# Patient Record
Sex: MALE
[De-identification: names, ages, dates, MRNs, and addresses within clinical notes are randomized per-mention and may not be internally consistent; named-entity substitution may affect disease eponyms.]

---

## 2018-06-27 ENCOUNTER — HOSPITAL ENCOUNTER (EMERGENCY)
Dept: HOSPITAL 88 - FSED | Age: 68
Discharge: HOME | End: 2018-06-27
Payer: MEDICARE

## 2018-06-27 VITALS — BODY MASS INDEX: 25.11 KG/M2 | HEIGHT: 67 IN | WEIGHT: 160 LBS

## 2018-06-27 DIAGNOSIS — I10: ICD-10-CM

## 2018-06-27 DIAGNOSIS — K91.841: Primary | ICD-10-CM

## 2018-06-27 DIAGNOSIS — I25.2: ICD-10-CM

## 2018-06-27 DIAGNOSIS — E78.5: ICD-10-CM

## 2018-06-27 PROCEDURE — 99283 EMERGENCY DEPT VISIT LOW MDM: CPT

## 2019-09-02 ENCOUNTER — HOSPITAL ENCOUNTER (OUTPATIENT)
Dept: HOSPITAL 88 - FSED | Age: 69
Setting detail: OBSERVATION
LOS: 1 days | Discharge: HOME | End: 2019-09-03
Attending: INTERNAL MEDICINE | Admitting: INTERNAL MEDICINE
Payer: MEDICARE

## 2019-09-02 VITALS — HEIGHT: 68 IN | BODY MASS INDEX: 23.64 KG/M2 | WEIGHT: 156 LBS

## 2019-09-02 VITALS — SYSTOLIC BLOOD PRESSURE: 148 MMHG | DIASTOLIC BLOOD PRESSURE: 83 MMHG

## 2019-09-02 DIAGNOSIS — Z95.5: ICD-10-CM

## 2019-09-02 DIAGNOSIS — I10: ICD-10-CM

## 2019-09-02 DIAGNOSIS — E78.00: ICD-10-CM

## 2019-09-02 DIAGNOSIS — I25.10: ICD-10-CM

## 2019-09-02 DIAGNOSIS — R07.9: Primary | ICD-10-CM

## 2019-09-02 PROCEDURE — 93005 ELECTROCARDIOGRAM TRACING: CPT

## 2019-09-02 PROCEDURE — 82550 ASSAY OF CK (CPK): CPT

## 2019-09-02 PROCEDURE — 80061 LIPID PANEL: CPT

## 2019-09-02 PROCEDURE — 71046 X-RAY EXAM CHEST 2 VIEWS: CPT

## 2019-09-02 PROCEDURE — 36415 COLL VENOUS BLD VENIPUNCTURE: CPT

## 2019-09-02 PROCEDURE — 99284 EMERGENCY DEPT VISIT MOD MDM: CPT

## 2019-09-02 PROCEDURE — 82553 CREATINE MB FRACTION: CPT

## 2019-09-02 PROCEDURE — 84484 ASSAY OF TROPONIN QUANT: CPT

## 2019-09-02 PROCEDURE — 93306 TTE W/DOPPLER COMPLETE: CPT

## 2019-09-02 PROCEDURE — 85025 COMPLETE CBC W/AUTO DIFF WBC: CPT

## 2019-09-02 PROCEDURE — 80053 COMPREHEN METABOLIC PANEL: CPT

## 2019-09-02 RX ADMIN — FAMOTIDINE SCH MG: 20 TABLET, FILM COATED ORAL at 21:20

## 2019-09-02 NOTE — XMS REPORT
Patient Summary Document

                             Created on: 2019



ELIZABETH WEEKS

External Reference #: [id number]

: 1950

Sex: Male



Demographics







                          Address                   [address] [place]  [address]

 

                          Home Phone                [phone]

 

                          Preferred Language        Unknown

 

                          Marital Status            Unknown

 

                          B[employer]ist Affiliation     Unknown

 

                          Race                      Unknown

 

                                        Additional Race(s)  

 

                          Ethnic Group              Unknown





Author







                          [facility]                    [facility]

 

                          Address                   Unknown

 

                          Phone                     Unavailable







Care Team Providers







                    Care Team Member Name    Role                Phone

 

                    LASHAUN CAPUTO    Unavailable         Unavailable







Problems

This patient has no known problems.



Allergies, Adverse Reactions, Alerts

This patient has no known allergies or adverse reactions.



Medications

This patient has no known medications.



Results







           Test Description    Test Time    Test Comments    Text Results    Atomic Results    Result

 Comments

 

                CXR 2 VIEW - HOPD    2019 20:07:00                                                         

                                                 [place] [address]      Patient Name: ELIZABETH WEEKS                                   MR 
#: [id number]                     : 1950                               
   Age/Sex: 69/M  Acct #: [id number]                              Req #: [id number]
[id number]  Adm Physician:                                                      
Ordered by: MARIA ESTHER CAPUTO MD                            Report #: [id number]
       Location: [facility]                                    Room/Bed:              
      
___________________________________________________________________________________________________
   Procedure: [id number] HOPD/CXR 2 VIEW - HOPD  Exam Date: 19             
              Exam Time:                                               
REPORT STATUS: Signed    EXAMINATION:  CXR 2 VIEW - HOPD          INDICATION: C
hest pain for 12 hours, history of MI     2019      COMPARISON:  
None           FINDINGS:  PA and lateral views      TUBES and LINES:  None.     
LUNGS:  Lungs are well inflated.  There is no evidence of pneumonia or   
pulmonary edema.      PLEURA:  No pleural effusion or pneumothorax. There is 
central eventration of   the right diaphragm.      HEART AND MEDIASTINUM:  The 
heart is top normal in size. The aorta is mildly   tortuous. Coronary artery 
stents are present.      BONES AND SOFT TISSUES:  Mild degenerative changes of 
the spine. No focal   osseous lesions.   Soft tissues are unremarkable.      
UPPER ABDOMEN: No free air under the diaphragm.       IMPRESSION:    No acute 
thoracic abnormality.         Signed by: Dr. Kateryna Baldwin MD on 2019 
8:08 PM        Dictated By: KATERYNA BALDWIN MD  Electronically Signed By: 
KATERYNA BALDWIN MD on 19  Transcribed By: ABHI on 19 
     COPY TO:   MARIA ESTHER CAPUTO MD

## 2019-09-02 NOTE — DIAGNOSTIC IMAGING REPORT
EXAMINATION:  CXR 2 VIEW - HOPD    



INDICATION: Chest pain for 12 hours, history of MI 

^[id number]

^1945



COMPARISON:  None

     

FINDINGS:  PA and lateral views



TUBES and LINES:  None.



LUNGS:  Lungs are well inflated.  There is no evidence of pneumonia or

pulmonary edema.



PLEURA:  No pleural effusion or pneumothorax. There is central eventration of

the right diaphragm.



HEART AND MEDIASTINUM:  The heart is top normal in size. The aorta is mildly

tortuous. Coronary artery stents are present.



BONES AND SOFT TISSUES:  Mild degenerative changes of the spine. No focal

osseous lesions.   Soft tissues are unremarkable.



UPPER ABDOMEN: No free air under the diaphragm. 



IMPRESSION: 

No acute thoracic abnormality.





Signed by: Dr. Marion Baldwin MD on 9/2/2019 8:08 PM

## 2019-09-02 NOTE — XMS REPORT
Clinical Summary

                             Created on: 2019



Vish Garcia

External Reference #: [id number]

: 1950

Sex: Male



Demographics







                          Address                   [address] [place]  [address]

 

                          Home Phone                [phone]

 

                          Preferred Language        English

 

                          Marital Status            Unknown

 

                          Muslim Affiliation     Unknown

 

                          Race                      White

 

                          Ethnic Group              /Latin





Author







                          Author                    [facility]

 

                          Address                   Unknown

 

                          Phone                     Unavailable







Support







                Name            Relationship    Address         Phone

 

                Christian Garcia    [employer]            Unknown         [phone]

 

                Neisha Garcia    [employer]            Unknown         [phone]







Care Team Providers







                    Care Team Member Name    Role                Phone

 

                          PCP                       Unavailable







Allergies

Not on File



Medications

Not on file



Active Problems





Not on file



Social History







                                        Date



                 Tobacco Use     Types           Packs/Day       Years Used 

 

                                         



                                         Never Assessed    









 



                           Sex Assigned at Birth     Date Recorded

 

 



                                         Not on file 









                                        Industry



                           Job Start Date            Occupation 

 

                                        Not on file



                           Not on file               Not on file 









                                        Travel End



                           Travel History            Travel Start 

 





                                         No recent travel history available.







Last Filed Vital Signs

Not on file



Plan of Treatment





Not on file



Results

Not on fileafter 2018



Insurance







     



            Payer      Benefit     Subscriber ID     Type       Phone      Address



                                         Plan /    



                                         Group    

 

     



                     [facility]   



                                         MEDIC[facility]    



                                         ADV    









     



            Guarantor Name     Account     Relation to     Date of     Phone      Billing Address



                     Type                Patient             Birth  

 

     



            Vish Garcia     Personal/F     Self       1950     [phone]     [address]               (Home)              [place] [address]

## 2019-09-02 NOTE — XMS REPORT
Clinical Summary

                             Created on: 2019



Vish Garcia

External Reference #: [id number]

: 1950

Sex: Male



Demographics







                          Address                   [address] [place]  [address]

 

                          Home Phone                [phone]

 

                          Preferred Language        English

 

                          Marital Status            Single

 

                          Sabianist Affiliation     [id number]

 

                          Race                      White

 

                          Ethnic Group              /Latin





Author







                          Author                    [facility]

 

                          Organization              [employer]

 

                          Address                   Unknown

 

                          Phone                     Unavailable







Support







                Name            Relationship    Address         Phone

 

                Christian Garcia    [employer]            Unknown         [phone]







Care Team Providers







                    Care Team Member Name    Role                Phone

 

                    Asked, No Pcp       PCP                 Unavailable







Allergies

No Known Allergies



Medications

Not on file



Active Problems





Not on file



Social History







                                        Date



                 Tobacco Use     Types           Packs/Day       Years Used 

 

                                         



                                         Never Smoker    

 

    



                                         Smokeless Tobacco: Never   



                                         Used   









                    Drinks/Week         oz/Week             Comments



                                         Alcohol Use   

 

                                                             



                                         No   









 



                           Sex Assigned at Birth     Date Recorded

 

 



                                         Not on file 









                                        Industry



                           Job Start Date            Occupation 

 

                                        Not on file



                           Not on file               Not on file 









                                        Travel End



                           Travel History            Travel Start 

 





                                         No recent travel history available.







Last Filed Vital Signs

Not on file



Plan of Treatment





Not on file



Results

Not on fileafter 2018



Insurance







                                        Type



            Payer      Benefit     Subscriber ID     Effective     Phone      Address 



                           Plan /                    Dates   



                                         Group     

 

                                        O



                 [facility]     10/1/2016-   



                           ADVANTAGE                 Present   



                                         [facility]     









     



            Guarantor Name     Account     Relation to     Date of     Phone      Billing Address



                     Type                Patient             Birth  

 

     



            Vish Garcia     Personal/F     Self       1950     [phone]     [address]





                     [address]               (Home)              [place] [address]







Advance Directives





For more information, please contact: [phone]









                          Patient Representative    Explanation



                           Type                      Date Recorded  

 

                                                     



                           Advance Directives,       2018  6:58 PM  



                                         Living Will and   



                                         Medical Power of

## 2019-09-02 NOTE — XMS REPORT
Clinical Summary

                             Created on: 2019



Vish Garcia

External Reference #: [id number]

: 1950

Sex: Male



Demographics







                          Address                   [address] [place]  [address]

 

                          Home Phone                [phone]

 

                          Preferred Language        English

 

                          Marital Status            Unknown

 

                          Mormon Affiliation     Unknown

 

                          Race                      White

 

                          Ethnic Group              /Latin





Author







                          Author                    [facility]

 

                          Address                   Unknown

 

                          Phone                     Unavailable







Support







                Name            Relationship    Address         Phone

 

                Christian Garcia    [employer]            Unknown         [phone]

 

                Neisha Garcia    [employer]            Unknown         [phone]







Care Team Providers







                    Care Team Member Name    Role                Phone

 

                          PCP                       Unavailable







Allergies

Not on File



Medications

Not on file



Active Problems





Not on file



Social History







                                        Date



                 Tobacco Use     Types           Packs/Day       Years Used 

 

                                         



                                         Never Assessed    









 



                           Sex Assigned at Birth     Date Recorded

 

 



                                         Not on file 









                                        Industry



                           Job Start Date            Occupation 

 

                                        Not on file



                           Not on file               Not on file 









                                        Travel End



                           Travel History            Travel Start 

 





                                         No recent travel history available.







Last Filed Vital Signs

Not on file



Plan of Treatment





Not on file



Results

Not on fileafter 2018



Insurance







     



            Payer      Benefit     Subscriber ID     Type       Phone      Address



                                         Plan /    



                                         Group    

 

     



                     [facility]          xxxxxxxxxxx   



                                         MEDICARE    



                                         ADV    









     



            Guarantor Name     Account     Relation to     Date of     Phone      Billing Address



                     Type                Patient             Birth  

 

     



            Vish Garcia     Personal/F     Self       1950     [phone]     [address]               (Home)              [place] [address]

## 2019-09-02 NOTE — XMS REPORT
Clinical Summary

                             Created on: 2019



Vish Garcia

External Reference #: [id number]

: 1950

Sex: Male



Demographics







                          Address                   [address] [place]  [address]

 

                          Home Phone                [phone]

 

                          Preferred Language        English

 

                          Marital Status            Single

 

                          Quaker Affiliation     [id number]

 

                          Race                      White

 

                          Ethnic Group              /Latin





Author







                          Author                    [facility]

 

                          Organization              [employer]

 

                          Address                   Unknown

 

                          Phone                     Unavailable







Support







                Name            Relationship    Address         Phone

 

                Christian Garcia    [employer]            Unknown         [phone]







Care Team Providers







                    Care Team Member Name    Role                Phone

 

                    Asked, No Pcp       PCP                 Unavailable







Allergies

No Known Allergies



Medications

Not on file



Active Problems





Not on file



Social History







                                        Date



                 Tobacco Use     Types           Packs/Day       Years Used 

 

                                         



                                         Never Smoker    

 

    



                                         Smokeless Tobacco: Never   



                                         Used   









                    Drinks/Week         oz/Week             Comments



                                         Alcohol Use   

 

                                                             



                                         No   









 



                           Sex Assigned at Birth     Date Recorded

 

 



                                         Not on file 









                                        Industry



                           Job Start Date            Occupation 

 

                                        Not on file



                           Not on file               Not on file 









                                        Travel End



                           Travel History            Travel Start 

 





                                         No recent travel history available.







Last Filed Vital Signs

Not on file



Plan of Treatment





Not on file



Results

Not on fileafter 2018



Insurance







                                        Type



            Payer      Benefit     Subscriber ID     Effective     Phone      Address 



                           Plan /                    Dates   



                                         Group     

 

                                        O



                 [facility]     10/1/2016-   



                           ADVANTAGE                 Present   



                                         [facility]     









     



            Guarantor Name     Account     Relation to     Date of     Phone      Billing Address



                     Type                Patient             Birth  

 

     



            Vish Garcia     Personal/F     Self       1950     [phone]     [address]





                     [address]               (Home)              [place] [address]







Advance Directives





For more information, please contact: [phone]









                          Patient Representative    Explanation



                           Type                      Date Recorded  

 

                                                     



                           Advance Directives,       2018  6:58 PM  



                                         Living Will and   



                                         Medical Power of

## 2019-09-03 VITALS — DIASTOLIC BLOOD PRESSURE: 70 MMHG | SYSTOLIC BLOOD PRESSURE: 113 MMHG

## 2019-09-03 VITALS — SYSTOLIC BLOOD PRESSURE: 125 MMHG | DIASTOLIC BLOOD PRESSURE: 70 MMHG

## 2019-09-03 VITALS — DIASTOLIC BLOOD PRESSURE: 83 MMHG | SYSTOLIC BLOOD PRESSURE: 148 MMHG

## 2019-09-03 VITALS — SYSTOLIC BLOOD PRESSURE: 113 MMHG | DIASTOLIC BLOOD PRESSURE: 70 MMHG

## 2019-09-03 LAB
ALBUMIN SERPL-MCNC: 3.6 G/DL (ref 3.5–5)
ALBUMIN/GLOB SERPL: 1.2 {RATIO} (ref 0.8–2)
ALP SERPL-CCNC: 125 IU/L (ref 40–150)
ALT SERPL-CCNC: 12 IU/L (ref 0–55)
ANION GAP SERPL CALC-SCNC: 11.8 MMOL/L (ref 8–16)
BASOPHILS # BLD AUTO: 0.1 10*3/UL (ref 0–0.1)
BASOPHILS NFR BLD AUTO: 0.7 % (ref 0–1)
BUN SERPL-MCNC: 12 MG/DL (ref 7–26)
BUN/CREAT SERPL: 12 (ref 6–25)
CALCIUM SERPL-MCNC: 9.2 MG/DL (ref 8.4–10.2)
CHLORIDE SERPL-SCNC: 100 MMOL/L (ref 98–107)
CHOLEST SERPL-MCNC: 86 MD/DL (ref 0–199)
CHOLEST/HDLC SERPL: 2.9 {RATIO} (ref 3.9–4.7)
CK MB SERPL-MCNC: 1.8 NG/ML (ref 0–5)
CK MB SERPL-MCNC: 1.9 NG/ML (ref 0–5)
CK SERPL-CCNC: 171 IU/L (ref 30–200)
CK SERPL-CCNC: 181 IU/L (ref 30–200)
CO2 SERPL-SCNC: 26 MMOL/L (ref 22–29)
DEPRECATED NEUTROPHILS # BLD AUTO: 4.8 10*3/UL (ref 2.1–6.9)
EGFRCR SERPLBLD CKD-EPI 2021: > 60 ML/MIN (ref 60–?)
EOSINOPHIL # BLD AUTO: 0.2 10*3/UL (ref 0–0.4)
EOSINOPHIL NFR BLD AUTO: 2.2 % (ref 0–6)
ERYTHROCYTE [DISTWIDTH] IN CORD BLOOD: 13.3 % (ref 11.7–14.4)
GLOBULIN PLAS-MCNC: 3.1 G/DL (ref 2.3–3.5)
GLUCOSE SERPLBLD-MCNC: 106 MG/DL (ref 74–118)
HCT VFR BLD AUTO: 35.1 % (ref 38.2–49.6)
HDLC SERPL-MSCNC: 30 MG/DL (ref 40–60)
HGB BLD-MCNC: 11.8 G/DL (ref 14–18)
LDLC SERPL CALC-MCNC: 37 MG/DL (ref 60–130)
LYMPHOCYTES # BLD: 2.6 10*3/UL (ref 1–3.2)
LYMPHOCYTES NFR BLD AUTO: 31.2 % (ref 18–39.1)
MCH RBC QN AUTO: 29.1 PG (ref 28–32)
MCHC RBC AUTO-ENTMCNC: 33.6 G/DL (ref 31–35)
MCV RBC AUTO: 86.7 FL (ref 81–99)
MONOCYTES # BLD AUTO: 0.7 10*3/UL (ref 0.2–0.8)
MONOCYTES NFR BLD AUTO: 7.8 % (ref 4.4–11.3)
NEUTS SEG NFR BLD AUTO: 57.7 % (ref 38.7–80)
PLATELET # BLD AUTO: 236 X10E3/UL (ref 140–360)
POTASSIUM SERPL-SCNC: 3.8 MMOL/L (ref 3.5–5.1)
RBC # BLD AUTO: 4.05 X10E6/UL (ref 4.3–5.7)
SODIUM SERPL-SCNC: 134 MMOL/L (ref 136–145)
TRIGL SERPL-MCNC: 96 MG/DL (ref 0–149)

## 2019-09-03 RX ADMIN — FAMOTIDINE SCH MG: 20 TABLET, FILM COATED ORAL at 08:48

## 2019-09-03 NOTE — HISTORY AND PHYSICAL
PRIMARY CARE PHYSICIAN:  Dr. Dajuan Schaffer at [facility]

 

CHIEF COMPLAINT:  Chest pain that lasted over 12 hours.

 

HISTORY OF PRESENT ILLNESS:  This is a 69-year-old male with past medical history of

hypertension, high cholesterol, and CAD with three stents that was done in 2017,

presented to the [facility] ER with complaints of chest pain that started 6:00 a.m.

yesterday and has been mildly present throughout the day.  He reports the pain is

stabbing and rates it at a 3 to 5, mild discomfort, not excruciating.  He reports mild

left shoulder pain, which resolved.  He denies any fever, chills, nausea, vomiting,

abdominal pain, dysuria, hematuria, cough, or diaphoresis.  He did not taking any

medication to relieve his symptoms so his son brought him in, in the afternoon as the

pain was still present.  He was kept on the monitor, sinus rhythm with no ST changes on

EKG.  Cardiac enzymes have been negative, chest x-ray unremarkable.  Was sent under

observation. 

 

PAST MEDICAL HISTORY:  

1. Hypertension.

2. High cholesterol.

3. CAD with stent placement.

 

PAST SURGICAL HISTORY:  Left heart catheterization with stent x2 in 2017.

 

FAMILY MEDICAL HISTORY:  He reports diabetes on both father and mother side.

 

SOCIAL HISTORY:  He denies any tobacco, alcohol, or illicit drug use.  He is ambulatory.

 

REVIEW OF SYSTEMS:

GENERAL:  Appears in no distress. 

HEENT:  No mouth sores. 

LUNGS:  No shortness of breath or cough. 

CARDIOVASCULAR:  Chest pain improved, no palpitations or edema. 

GI:  No nausea, vomiting, or diarrhea. 

:  No dysuria or hematuria. 

NEUROLOGIC:  Alert and oriented. 

MUSCULOSKELETAL:  Able to walk.  No dizziness. 

SKIN:  Intact and dry.  No rash.

 

PHYSICAL EXAMINATION:

VITAL SIGNS:  Temperature 96.7, pulse is 61, blood pressure is 113/70, respirations 16,

SpO2 is 97. 

GENERAL:  Appears in no acute distress. 

HEENT:  Normocephalic, atraumatic. 

LUNGS:  Clear to auscultation. 

CARDIOVASCULAR:  S1 and S2.  No murmurs. 

GI:  Soft, nontender, nondistended. 

NEUROLOGIC:  Alert, awake, oriented x3. 

MUSCULOSKELETAL:  Active ROM.  No pain or edema noted. 

SKIN:  Has no rash.  Dry and intact.

LABORATORY DATA:  Sodium 134, potassium 3.8, chloride 100, CO2 is 26, creatinine is

0.99.  WBCs 8.29, hemoglobin 11.8, hematocrit 35, and troponin is negative.  LDL 37, HDL

30, triglycerides 96.  Chest x-ray is unremarkable. 

 

IMPRESSION:  

1. Chest pain, rule out acute coronary syndrome.

2. Hypertension.

3. History of coronary artery disease with stents x3.

4. High cholesterol.

 

PLAN:  We will continue to monitor him on cardiac telemetry, continue his home

medication of aspirin, Plavix, and atorvastatin, we will check an echocardiogram as it

has been more than a year since his last echo per his son.  If echo is normal, we will

discharge home later today to follow up with his PCP, Dr. Schaffer and his cardiologist,

Dr. Davis in 1 to 2 weeks.  He is full code and power of  is his son, Christian Garcia. 

 

 

Dictated by Cheryl Perez, LORRAINE

 

______________________________

Rancho Garrison MD

 

MY/MODL

D:  09/03/2019 13:26:51

T:  09/03/2019 19:11:07

Job #:  [id number]

## 2019-09-03 NOTE — NUR
patient discharged home, IV canula removed with tip intact, no ss of infiltration. denies 
any pain or distress, son at bed side to take patient home. transported via wheelchair to 
[facility]

## 2021-06-14 ENCOUNTER — HOSPITAL ENCOUNTER (EMERGENCY)
Dept: HOSPITAL 88 - FSED | Age: 71
LOS: 1 days | Discharge: HOME | End: 2021-06-15
Payer: MEDICARE

## 2021-06-14 VITALS — BODY MASS INDEX: 24.48 KG/M2 | HEIGHT: 67 IN | WEIGHT: 156 LBS

## 2021-06-14 DIAGNOSIS — N40.0: ICD-10-CM

## 2021-06-14 DIAGNOSIS — E78.5: ICD-10-CM

## 2021-06-14 DIAGNOSIS — N20.0: ICD-10-CM

## 2021-06-14 DIAGNOSIS — I25.10: ICD-10-CM

## 2021-06-14 DIAGNOSIS — R31.9: ICD-10-CM

## 2021-06-14 DIAGNOSIS — R30.0: Primary | ICD-10-CM

## 2021-06-14 DIAGNOSIS — F03.90: ICD-10-CM

## 2021-06-14 PROCEDURE — 85025 COMPLETE CBC W/AUTO DIFF WBC: CPT

## 2021-06-14 PROCEDURE — 81003 URINALYSIS AUTO W/O SCOPE: CPT

## 2021-06-14 PROCEDURE — 74176 CT ABD & PELVIS W/O CONTRAST: CPT

## 2021-06-14 PROCEDURE — 80053 COMPREHEN METABOLIC PANEL: CPT

## 2021-06-14 PROCEDURE — 99284 EMERGENCY DEPT VISIT MOD MDM: CPT
